# Patient Record
Sex: MALE | Race: WHITE
[De-identification: names, ages, dates, MRNs, and addresses within clinical notes are randomized per-mention and may not be internally consistent; named-entity substitution may affect disease eponyms.]

---

## 2019-08-23 ENCOUNTER — HOSPITAL ENCOUNTER (INPATIENT)
Dept: HOSPITAL 74 - YASAS | Age: 54
LOS: 5 days | Discharge: HOME | End: 2019-08-28
Attending: SURGERY | Admitting: SURGERY
Payer: COMMERCIAL

## 2019-08-23 VITALS — BODY MASS INDEX: 29.9 KG/M2

## 2019-08-23 DIAGNOSIS — F31.9: ICD-10-CM

## 2019-08-23 DIAGNOSIS — F10.230: Primary | ICD-10-CM

## 2019-08-23 DIAGNOSIS — F17.210: ICD-10-CM

## 2019-08-23 DIAGNOSIS — F32.9: ICD-10-CM

## 2019-08-23 DIAGNOSIS — F10.24: ICD-10-CM

## 2019-08-23 LAB
ALBUMIN SERPL-MCNC: 3.4 G/DL (ref 3.4–5)
ALP SERPL-CCNC: 120 U/L (ref 45–117)
ALT SERPL-CCNC: 19 U/L (ref 13–61)
ANION GAP SERPL CALC-SCNC: 8 MMOL/L (ref 8–16)
AST SERPL-CCNC: 12 U/L (ref 15–37)
BILIRUB SERPL-MCNC: 0.4 MG/DL (ref 0.2–1)
BUN SERPL-MCNC: 9.4 MG/DL (ref 7–18)
CALCIUM SERPL-MCNC: 9.2 MG/DL (ref 8.5–10.1)
CHLORIDE SERPL-SCNC: 106 MMOL/L (ref 98–107)
CO2 SERPL-SCNC: 27 MMOL/L (ref 21–32)
CREAT SERPL-MCNC: 0.9 MG/DL (ref 0.55–1.3)
DEPRECATED RDW RBC AUTO: 16.7 % (ref 11.9–15.9)
GLUCOSE SERPL-MCNC: 99 MG/DL (ref 74–106)
HCT VFR BLD CALC: 40.1 % (ref 35.4–49)
HGB BLD-MCNC: 13.6 GM/DL (ref 11.7–16.9)
MCH RBC QN AUTO: 31.5 PG (ref 25.7–33.7)
MCHC RBC AUTO-ENTMCNC: 33.9 G/DL (ref 32–35.9)
MCV RBC: 93 FL (ref 80–96)
PLATELET # BLD AUTO: 229 K/MM3 (ref 134–434)
PMV BLD: 8.3 FL (ref 7.5–11.1)
POTASSIUM SERPLBLD-SCNC: 4.2 MMOL/L (ref 3.5–5.1)
PROT SERPL-MCNC: 7.6 G/DL (ref 6.4–8.2)
RBC # BLD AUTO: 4.32 M/MM3 (ref 4–5.6)
SODIUM SERPL-SCNC: 141 MMOL/L (ref 136–145)
WBC # BLD AUTO: 9.3 K/MM3 (ref 4–10)

## 2019-08-23 PROCEDURE — HZ2ZZZZ DETOXIFICATION SERVICES FOR SUBSTANCE ABUSE TREATMENT: ICD-10-PCS | Performed by: ALLERGY & IMMUNOLOGY

## 2019-08-23 RX ADMIN — BUSPIRONE HYDROCHLORIDE SCH MG: 10 TABLET ORAL at 22:12

## 2019-08-23 RX ADMIN — Medication SCH MG: at 22:12

## 2019-08-23 RX ADMIN — BUSPIRONE HYDROCHLORIDE SCH: 10 TABLET ORAL at 14:34

## 2019-08-23 NOTE — EKG
Test Reason : 

Blood Pressure : ***/*** mmHG

Vent. Rate : 089 BPM     Atrial Rate : 089 BPM

   P-R Int : 136 ms          QRS Dur : 096 ms

    QT Int : 354 ms       P-R-T Axes : 000 068 027 degrees

   QTc Int : 430 ms

 

NORMAL SINUS RHYTHM

NORMAL ECG

NO PREVIOUS ECGS AVAILABLE

Confirmed by GERALD ANDERS MD ([id number]) on 8/23/2019 11:46:38 AM

 

Referred By:             Confirmed By:GERALD ANDERS MD

## 2019-08-23 NOTE — HP
CIWA Score


Nausea/Vomitin-No Nausea/No Vomiting


Muscle Tremors: 1-None Visible, but Felt


Anxiety: 4-Mod. Anxious/Guarded


Agitation: 3


Paroxysmal Sweats: 2


Orientation: 0-Oriented


Tacttile Disturbances: 0-None


Auditory Disturbances: 0-None


Visual Disturbances: 0-None


Headache: 2-Mild


CIWA-Ar Total Score: 12





- Admission Criteria


OASAS Guidelines: Admission for Medically Managed Detox: 


Requires at least one of the followin. CIWA greater than 12


2. Seizures within the past 24 hours


3. Delirium tremens within the past 24 hours


4. Hallucinations within the past 24 hours


5. Acute intervention needed for co  occurring medical disorder


6. Acute intervention needed for co  occurring psychiatric disorder


7. Severe withdrawal that cannot be handled at a lower level of care (continued


    vomiting, continued diarrhea, abnormal vital signs) requiring intravenous


    medication and/or fluids


8. Pregnancy








Admission ROS [facility]





- hospitals


Chief Complaint: 





ETOH withdrawal symptoms


Allergies/Adverse Reactions: 


 Allergies











Allergy/AdvReac Type Severity Reaction Status Date / Time


 


Fish Containing Products Allergy Intermediate Rash Verified 14 16:55


 


No Known Drug Allergies Allergy   Verified 14 15:03











History of Present Illness: 





Patient presents with ETOH withdrawal symptoms. Patient is known to [facility] due to 

previous admissions with last admission in . Patient states he was admitted 

to detox at [facility] 3-4 weeks ago but relapsed day after discharge. Patient 

states he started drinking at age 13, drinks all day and night 12-16 oz of beer 

and 4 locos daily, +binge drinking, eye openers and black outs. Denies hx of 

seizures, use of cocaine, marijuana and heroin. Patient's last drink was early 

this morning. PMH includes tobacco use, anxiety and depression. Patient denies 

SI/HI. Compliant with psych meds which he bought with him. 





Fluoxetine 20mg daily


Buspar 15mg bid








Urine tox + bzo


TIA 0.0


Exam Limitations: No Limitations





- Ebola screening


Have you traveled outside of the country in the last 21 days: No (N)


Have you had contact with anyone from an Ebola affected area: No


Have you been sick,other than usual withdrawal symptoms: No


Do you have a fever: No





- Review of Systems


Constitutional: Night Sweats, Changes in sleep


EENT: reports: No Symptoms Reported


Respiratory: reports: Cough (dry cough)


Cardiac: reports: No Symptoms Reported


GI: reports: Poor Fluid Intake


: reports: Frequency (due to ETOH intake)


Musculoskeletal: reports: No Symptoms Reported


Integumentary: reports: Flushing, Sweating


Neuro: reports: Headache, Tremors


Endocrine: reports: Flushing


Hematology: reports: No Symptoms Reported


Psychiatric: reports: Orientated x3, Anxious





Patient History





- Patient Medical History


Hx Anemia: No


Hx Asthma: No


Hx Chronic Obstructive Pulmonary Disease (COPD): No


Hx Cancer: No


Hx Cardiac Disorders: No


Hx Congestive Heart Failure: No


Hx Hypertension: No


Hx Hypercholesterolemia: No


Hx Pacemaker: No


HX Cerebrovascular Accident: No


Hx Seizures: No


Hx Dementia: No


Hx Diabetes: No


Hx Gastrointestinal Disorders: No


Hx Liver Disease: No


Hx Genitourinary Disorders: No


Hx Sexually Transmitted Disorders: No


Hx Renal Disease (ESRD): No


Hx Thyroid Disease: No


Hx Human Immunodeficiency Virus (HIV): No (negative 6 months ago.)


Hx Hepatitis C: No


Hx Depression: Yes


Hx Suicide Attempt: No


Hx Bipolar Disorder: No


Hx Schizophrenia: No


Other Medical History: anxiety





- Patient Surgical History


Past Surgical History: Yes


Hx Neurologic Surgery: No


Hx Cataract Extraction: No


Hx Cardiac Surgery: No


Hx Lung Surgery: No


Hx Breast Surgery: No


Hx Breast Biopsy: No


Hx Abdominal Surgery: No


Hx Appendectomy: No


Hx Cholecystectomy: No


Hx Genitourinary Surgery: No


Hx Orthopedic Surgery: No


Other Surgical History: right lense placement


Anesthesia Reaction: No





- PPD History


Previous Implant?: Yes


Documented Results: Negative w/proof


Date: 14


Results: 0 mm


PPD to be Administered?: No





- Smoking Cessation


Smoking history: Current every day smoker


Have you smoked in the past 12 months: Yes


Aproximately how many cigarettes per day: 25


Cigars Per Day: 0


Hx Chewing Tobacco Use: No


Initiated information on smoking cessation: Yes


'Breaking Loose' booklet given: 19





- Substance & Tx. History


Hx Alcohol Use: Yes


Hx Substance Use: No


Substance Use Type: Alcohol





- Substances abused


  ** Alcohol


Substance route: Oral


Frequency: Daily


Amount used: 12 (16 OUNCES ) BEER AND, 4 CANS OF LOGOS


Age of first use: 14


Date of last use: 19





Family Disease History





- Family Disease History


Family Disease History: Other: Father ( [place])





Admission Physical Exam [facility]





- Vital Signs


Vital Signs: 


 Vital Signs - 24 hr











  19





  09:52


 


Temperature 97.3 F L


 


Pulse Rate 97 H


 


Respiratory 18





Rate 


 


Blood Pressure 167/92














- Physical


General Appearance: Yes: Nourished, Appropriately Dressed, Tremorous, Sweating, 

Anxious


HEENTM: Yes: EOMI, Hearing grossly Normal, Normal ENT Inspection, Normocephalic

, Normal Voice, CHRISTA, Pharynx Normal


Respiratory: Yes: Chest Non-Tender, Lungs Clear, Normal Breath Sounds, No 

Respiratory Distress, No Accessory Muscle Use


Neck: Yes: No masses,lesions,Nodules, Supple, Trachea in good position


Breast: Yes: Breast Exam Deferred


Cardiology: Yes: Regular Rhythm, Regular Rate, S1, S2, Edema (trace pedal edema)


Abdominal: Yes: Normal Bowel Sounds, Non Tender, Soft


Genitourinary: Yes: Frequency


Back: Yes: Normal Inspection


Musculoskeletal: Yes: full range of Motion, Gait Steady


Extremities: Yes: Normal Inspection, Normal Range of Motion, Non-Tender, Tremors


Neurological: Yes: CNs II-XII NML intact, Fully Oriented, Alert, Motor Strength 

5/5, Normal Response, Other (anxious)


Integumentary: Yes: Warm, Erythema, Moist


Lymphatic: Yes: Within Normal Limits





- Diagnostic


(1) Alcohol dependence with uncomplicated withdrawal


Current Visit: Yes   Status: Acute   





(2) Anxiety


Current Visit: Yes   Status: Chronic   





(3) Nicotine dependence


Current Visit: No   Status: Chronic   





Cleared for Admission [facility]





- Detox or Rehab


[facility] Level of Care: Medically Managed


Detox Regimen/Protocol: Librium





Breathalyzer





- Breathalyzer


Breathalyzer: 0





Urine Drug Screen





- Results


Drug screen NEGATIVE: No


Urine drug screen results: BZO-Benzodiazepines





Inpatient Rehab Admission





- Rehab Decision to Admit


Inpatient rehab admission?: No

## 2019-08-24 RX ADMIN — FLUOXETINE HYDROCHLORIDE SCH MG: 20 CAPSULE ORAL at 10:38

## 2019-08-24 RX ADMIN — BUSPIRONE HYDROCHLORIDE SCH MG: 10 TABLET ORAL at 22:00

## 2019-08-24 RX ADMIN — Medication SCH TAB: at 10:38

## 2019-08-24 RX ADMIN — Medication PRN MG: at 22:01

## 2019-08-24 RX ADMIN — BUSPIRONE HYDROCHLORIDE SCH MG: 10 TABLET ORAL at 10:40

## 2019-08-24 RX ADMIN — Medication SCH MG: at 21:58

## 2019-08-24 RX ADMIN — NICOTINE SCH MG: 21 PATCH TRANSDERMAL at 10:37

## 2019-08-24 NOTE — PN
S CIWA





- CIWA Score


Nausea/Vomitin-No Nausea/No Vomiting


Muscle Tremors: 2


Anxiety: 4-Mod. Anxious/Guarded


Agitation: 2


Paroxysmal Sweats: 3 (And  Alternating Hot / Cold Sesnsations.)


Orientation: 0-Oriented


Tacttile Disturbances: 0-None


Auditory Disturbances: 0-None


Visual Disturbances: 2-Mild Sensitivity


Headache: 0-None Present


CIWA-Ar Total Score: 13





[facility] Progress Note (SOAP)


Subjective: 





Anxious, Fatigue, Sweating, Tremors, Alternating Hot / Cold Sensations.


Objective: 


PATIENT A & O X 3. IN NO ACUTE DISTRESS.





19 15:33


 Vital Signs











Temperature  98.8 F   19 13:05


 


Pulse Rate  89   19 13:05


 


Respiratory Rate  18   19 13:05


 


Blood Pressure  115/65   19 13:05


 


O2 Sat by Pulse Oximetry (%)      








 Laboratory Tests











  19





  10:50 10:50 10:50


 


WBC  9.3  


 


RBC  4.32  


 


Hgb  13.6  


 


Hct  40.1  


 


MCV  93.0  


 


MCH  31.5  


 


MCHC  33.9  


 


RDW  16.7 H  


 


Plt Count  229  D  


 


MPV  8.3  


 


Sodium   141 


 


Potassium   4.2 


 


Chloride   106 


 


Carbon Dioxide   27 


 


Anion Gap   8 


 


BUN   9.4 


 


Creatinine   0.9 


 


Est GFR (CKD-EPI)AfAm   110.27 


 


Est GFR (CKD-EPI)NonAf   95.14 


 


Random Glucose   99 


 


Calcium   9.2 


 


Total Bilirubin   0.4 


 


AST   12 L 


 


ALT   19 


 


Alkaline Phosphatase   120 H 


 


Total Protein   7.6 


 


Albumin   3.4 


 


RPR Titer    Nonreactive














LABS NOTED.


RESULTS OF DETOX ADMISSION QFT  /TB TEST PENDING.





19 15:34


Assessment: 





19 15:34


WITHDRAWAL SYMPTOMS.


Plan: 





CONTINUE DETOX.

## 2019-08-25 RX ADMIN — BUSPIRONE HYDROCHLORIDE SCH MG: 10 TABLET ORAL at 22:02

## 2019-08-25 RX ADMIN — Medication SCH TAB: at 10:29

## 2019-08-25 RX ADMIN — HYDROCORTISONE SCH APPLIC: 1 CREAM TOPICAL at 18:34

## 2019-08-25 RX ADMIN — NICOTINE SCH MG: 21 PATCH TRANSDERMAL at 10:29

## 2019-08-25 RX ADMIN — Medication PRN MG: at 22:02

## 2019-08-25 RX ADMIN — ALUMINUM HYDROXIDE, MAGNESIUM HYDROXIDE, AND SIMETHICONE PRN ML: 200; 200; 20 SUSPENSION ORAL at 10:31

## 2019-08-25 RX ADMIN — Medication SCH MG: at 22:02

## 2019-08-25 RX ADMIN — HYDROCORTISONE SCH APPLIC: 1 CREAM TOPICAL at 22:02

## 2019-08-25 RX ADMIN — FLUOXETINE HYDROCHLORIDE SCH MG: 20 CAPSULE ORAL at 10:29

## 2019-08-25 RX ADMIN — BUSPIRONE HYDROCHLORIDE SCH MG: 10 TABLET ORAL at 10:29

## 2019-08-25 NOTE — PN
[facility] CIWA





- CIWA Score


Nausea/Vomitin-No Nausea/No Vomiting


Muscle Tremors: 3


Anxiety: 3


Agitation: 2


Paroxysmal Sweats: 2


Orientation: 0-Oriented


Tacttile Disturbances: 1-Very Mild Itch/Numbness


Auditory Disturbances: 0-None


Visual Disturbances: 1-Very Mild Sensitivity


Headache: 0-None Present


CIWA-Ar Total Score: 12





[facility] Progress Note (SOAP)


Subjective: 





54 years old male admitted on 19 for acute alcohol withdrawal sx 

management


doing well with librium detox regimen


feeling better ambulating on hallway tolerate food and fluid well 


social with peer in day room


Objective: 





19 12:49


 Vital Signs











Temperature  96.1 F L  19 09:14


 


Pulse Rate  97 H  19 09:14


 


Respiratory Rate  18   19 09:14


 


Blood Pressure  116/87   19 09:14


 


O2 Sat by Pulse Oximetry (%)      








 Laboratory Last Values











WBC  9.3 K/mm3 (4.0-10.0)   19  10:50    


 


RBC  4.32 M/mm3 (4.00-5.60)   19  10:50    


 


Hgb  13.6 GM/dL (11.7-16.9)   19  10:50    


 


Hct  40.1 % (35.4-49)   19  10:50    


 


MCV  93.0 fl (80-96)   19  10:50    


 


MCH  31.5 pg (25.7-33.7)   19  10:50    


 


MCHC  33.9 g/dl (32.0-35.9)   19  10:50    


 


RDW  16.7 % (11.9-15.9)  H  19  10:50    


 


Plt Count  229 K/MM3 (134-434)  D 19  10:50    


 


MPV  8.3 fl (7.5-11.1)   19  10:50    


 


Sodium  141 mmol/L (136-145)   19  10:50    


 


Potassium  4.2 mmol/L (3.5-5.1)   19  10:50    


 


Chloride  106 mmol/L ()   19  10:50    


 


Carbon Dioxide  27 mmol/L (21-32)   19  10:50    


 


Anion Gap  8 MMOL/L (8-16)   19  10:50    


 


BUN  9.4 mg/dL (7-18)   19  10:50    


 


Creatinine  0.9 mg/dL (0.55-1.3)   19  10:50    


 


Est GFR (CKD-EPI)AfAm  110.27   19  10:50    


 


Est GFR (CKD-EPI)NonAf  95.14   19  10:50    


 


Random Glucose  99 mg/dL ()   19  10:50    


 


Calcium  9.2 mg/dL (8.5-10.1)   19  10:50    


 


Total Bilirubin  0.4 mg/dL (0.2-1)   19  10:50    


 


AST  12 U/L (15-37)  L  19  10:50    


 


ALT  19 U/L (13-61)   19  10:50    


 


Alkaline Phosphatase  120 U/L ()  H  19  10:50    


 


Total Protein  7.6 g/dl (6.4-8.2)   19  10:50    


 


Albumin  3.4 g/dl (3.4-5.0)   19  10:50    


 


RPR Titer  Nonreactive  (NONREACTIVE)   19  10:50    








lab noted


Assessment: 





19 12:50


alcohol withdrawal sx


Plan: 





continue lirium detox regimen

## 2019-08-26 RX ADMIN — BUSPIRONE HYDROCHLORIDE SCH MG: 10 TABLET ORAL at 22:14

## 2019-08-26 RX ADMIN — NICOTINE SCH MG: 21 PATCH TRANSDERMAL at 10:17

## 2019-08-26 RX ADMIN — Medication SCH TAB: at 10:16

## 2019-08-26 RX ADMIN — FLUOXETINE HYDROCHLORIDE SCH MG: 20 CAPSULE ORAL at 10:16

## 2019-08-26 RX ADMIN — HYDROCORTISONE SCH: 1 CREAM TOPICAL at 13:30

## 2019-08-26 RX ADMIN — BUSPIRONE HYDROCHLORIDE SCH MG: 10 TABLET ORAL at 10:16

## 2019-08-26 RX ADMIN — TRAZODONE HYDROCHLORIDE PRN MG: 50 TABLET ORAL at 22:13

## 2019-08-26 RX ADMIN — HYDROCORTISONE SCH: 1 CREAM TOPICAL at 22:14

## 2019-08-26 RX ADMIN — Medication SCH MG: at 22:13

## 2019-08-26 RX ADMIN — HYDROCORTISONE SCH APPLIC: 1 CREAM TOPICAL at 10:17

## 2019-08-26 RX ADMIN — CLOTRIMAZOLE SCH: 1 CREAM TOPICAL at 22:14

## 2019-08-26 RX ADMIN — HYDROCORTISONE SCH: 1 CREAM TOPICAL at 17:20

## 2019-08-26 NOTE — PN
[facility] CIWA





- CIWA Score


Nausea/Vomitin-Mild Nausea/No Vomiting


Muscle Tremors: 2


Anxiety: 1-Mildly Anxious


Agitation: 1-Slight > Activity


Paroxysmal Sweats: 1-Minimal Palms Moist


Orientation: 0-Oriented


Tacttile Disturbances: 0-None


Auditory Disturbances: 0-None


Visual Disturbances: 0-None


Headache: 1-Very Mild


CIWA-Ar Total Score: 7





BHS Progress Note (SOAP)


Subjective: 





pt complaints of back pain, says detox is going well.





O:


 Vital Signs - 24 hr











  19





  17:44 21:46 00:30


 


Temperature 96.9 F L 97.5 F L 


 


Pulse Rate 86 97 H 


 


Respiratory 18 16 18





Rate   


 


Blood Pressure 127/84 124/77 














  19





  03:30 06:19 06:30


 


Temperature  97.7 F 


 


Pulse Rate  76 


 


Respiratory 18 18 18





Rate   


 


Blood Pressure  129/75 














  19





  09:24 13:45


 


Temperature 98.1 F 97.2 F L


 


Pulse Rate 88 79


 


Respiratory 16 18





Rate  


 


Blood Pressure 140/95 124/83








 Laboratory Tests











  19





  10:50 10:50 10:50


 


WBC  9.3  


 


RBC  4.32  


 


Hgb  13.6  


 


Hct  40.1  


 


MCV  93.0  


 


MCH  31.5  


 


MCHC  33.9  


 


RDW  16.7 H  


 


Plt Count  229  D  


 


MPV  8.3  


 


Sodium   141 


 


Potassium   4.2 


 


Chloride   106 


 


Carbon Dioxide   27 


 


Anion Gap   8 


 


BUN   9.4 


 


Creatinine   0.9 


 


Est GFR (CKD-EPI)AfAm   110.27 


 


Est GFR (CKD-EPI)NonAf   95.14 


 


Random Glucose   99 


 


Calcium   9.2 


 


Total Bilirubin   0.4 


 


AST   12 L 


 


ALT   19 


 


Alkaline Phosphatase   120 H 


 


Total Protein   7.6 


 


Albumin   3.4 


 


RPR Titer    Nonreactive








a/p: continue alcohol detox protocol


prn meds for back pain
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
## 2019-08-27 LAB
APPEARANCE UR: CLEAR
BILIRUB UR STRIP.AUTO-MCNC: NEGATIVE MG/DL
COLOR UR: YELLOW
KETONES UR QL STRIP: NEGATIVE
LEUKOCYTE ESTERASE UR QL STRIP.AUTO: NEGATIVE
NITRITE UR QL STRIP: NEGATIVE
PH UR: 6 [PH] (ref 5–8)
PROT UR QL STRIP: NEGATIVE
PROT UR QL STRIP: NEGATIVE
SP GR UR: 1.02 (ref 1.01–1.03)
UROBILINOGEN UR STRIP-MCNC: 0.2 MG/DL (ref 0.2–1)

## 2019-08-27 RX ADMIN — HYDROCORTISONE SCH: 1 CREAM TOPICAL at 21:42

## 2019-08-27 RX ADMIN — HYDROCORTISONE SCH: 1 CREAM TOPICAL at 16:59

## 2019-08-27 RX ADMIN — HYDROCORTISONE SCH: 1 CREAM TOPICAL at 10:17

## 2019-08-27 RX ADMIN — BUSPIRONE HYDROCHLORIDE SCH MG: 10 TABLET ORAL at 22:01

## 2019-08-27 RX ADMIN — CLOTRIMAZOLE SCH: 1 CREAM TOPICAL at 10:18

## 2019-08-27 RX ADMIN — ALUMINUM HYDROXIDE, MAGNESIUM HYDROXIDE, AND SIMETHICONE PRN ML: 200; 200; 20 SUSPENSION ORAL at 17:00

## 2019-08-27 RX ADMIN — HYDROCORTISONE SCH: 1 CREAM TOPICAL at 13:54

## 2019-08-27 RX ADMIN — BUSPIRONE HYDROCHLORIDE SCH MG: 10 TABLET ORAL at 10:18

## 2019-08-27 RX ADMIN — TRAZODONE HYDROCHLORIDE PRN MG: 50 TABLET ORAL at 21:40

## 2019-08-27 RX ADMIN — NICOTINE SCH MG: 21 PATCH TRANSDERMAL at 10:17

## 2019-08-27 RX ADMIN — FLUOXETINE HYDROCHLORIDE SCH MG: 20 CAPSULE ORAL at 10:17

## 2019-08-27 RX ADMIN — Medication SCH MG: at 21:40

## 2019-08-27 RX ADMIN — CLOTRIMAZOLE SCH: 1 CREAM TOPICAL at 21:42

## 2019-08-27 RX ADMIN — Medication SCH TAB: at 10:17

## 2019-08-27 NOTE — PN
S CIWA





- CIWA Score


Nausea/Vomitin


Muscle Tremors: 2


Anxiety: 2


Agitation: 2


Paroxysmal Sweats: 1-Minimal Palms Moist


Orientation: 0-Oriented


Tacttile Disturbances: 0-None


Auditory Disturbances: 0-None


Visual Disturbances: 0-None


Headache: 2-Mild


CIWA-Ar Total Score: 11





[facility] Progress Note (SOAP)


Subjective: 





alert,irritable,anxious,interrupted sleep,


Objective: 





19 13:52


 Vital Signs











Temperature  97.7 F   19 13:12


 


Pulse Rate  83   19 13:12


 


Respiratory Rate  18   19 13:12


 


Blood Pressure  128/86   19 13:12


 


O2 Sat by Pulse Oximetry (%)      











Assessment: 





19 13:52


withdrawal symptom


Plan: 





continue detox librium regimen,discharge in am

## 2019-08-28 VITALS — SYSTOLIC BLOOD PRESSURE: 133 MMHG | HEART RATE: 77 BPM | DIASTOLIC BLOOD PRESSURE: 85 MMHG | TEMPERATURE: 97.6 F

## 2019-08-28 RX ADMIN — CLOTRIMAZOLE SCH: 1 CREAM TOPICAL at 09:29

## 2019-08-28 RX ADMIN — HYDROCORTISONE SCH: 1 CREAM TOPICAL at 09:29

## 2019-08-28 RX ADMIN — NICOTINE SCH: 21 PATCH TRANSDERMAL at 09:29

## 2019-08-28 RX ADMIN — FLUOXETINE HYDROCHLORIDE SCH: 20 CAPSULE ORAL at 09:30

## 2019-08-28 RX ADMIN — Medication SCH: at 09:30

## 2019-10-11 ENCOUNTER — HOSPITAL ENCOUNTER (INPATIENT)
Dept: HOSPITAL 74 - YASAS | Age: 54
LOS: 5 days | Discharge: OTHER INSTITUTION | End: 2019-10-16
Attending: ALLERGY & IMMUNOLOGY | Admitting: ALLERGY & IMMUNOLOGY
Payer: COMMERCIAL

## 2019-10-11 VITALS — BODY MASS INDEX: 30 KG/M2

## 2019-10-11 DIAGNOSIS — Z59.0: ICD-10-CM

## 2019-10-11 DIAGNOSIS — R03.0: ICD-10-CM

## 2019-10-11 DIAGNOSIS — Z91.013: ICD-10-CM

## 2019-10-11 DIAGNOSIS — F10.230: Primary | ICD-10-CM

## 2019-10-11 DIAGNOSIS — R00.0: ICD-10-CM

## 2019-10-11 DIAGNOSIS — F41.9: ICD-10-CM

## 2019-10-11 DIAGNOSIS — F17.210: ICD-10-CM

## 2019-10-11 PROCEDURE — HZ2ZZZZ DETOXIFICATION SERVICES FOR SUBSTANCE ABUSE TREATMENT: ICD-10-PCS | Performed by: ALLERGY & IMMUNOLOGY

## 2019-10-11 RX ADMIN — Medication SCH MG: at 22:14

## 2019-10-11 RX ADMIN — Medication PRN MG: at 22:14

## 2019-10-11 RX ADMIN — Medication SCH TAB: at 10:46

## 2019-10-11 SDOH — ECONOMIC STABILITY - HOUSING INSECURITY: HOMELESSNESS: Z59.0
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
## 2019-10-12 LAB
ALBUMIN SERPL-MCNC: 3 G/DL (ref 3.4–5)
ALP SERPL-CCNC: 95 U/L (ref 45–117)
ALT SERPL-CCNC: 23 U/L (ref 13–61)
ANION GAP SERPL CALC-SCNC: 4 MMOL/L (ref 8–16)
AST SERPL-CCNC: 19 U/L (ref 15–37)
BILIRUB SERPL-MCNC: 0.4 MG/DL (ref 0.2–1)
BUN SERPL-MCNC: 8.8 MG/DL (ref 7–18)
CALCIUM SERPL-MCNC: 8.7 MG/DL (ref 8.5–10.1)
CHLORIDE SERPL-SCNC: 110 MMOL/L (ref 98–107)
CO2 SERPL-SCNC: 28 MMOL/L (ref 21–32)
CREAT SERPL-MCNC: 0.9 MG/DL (ref 0.55–1.3)
DEPRECATED RDW RBC AUTO: 16.9 % (ref 11.9–15.9)
GLUCOSE SERPL-MCNC: 85 MG/DL (ref 74–106)
HCT VFR BLD CALC: 44.4 % (ref 35.4–49)
HGB BLD-MCNC: 14.8 GM/DL (ref 11.7–16.9)
MCH RBC QN AUTO: 31.8 PG (ref 25.7–33.7)
MCHC RBC AUTO-ENTMCNC: 33.3 G/DL (ref 32–35.9)
MCV RBC: 95.4 FL (ref 80–96)
PLATELET # BLD AUTO: 158 K/MM3 (ref 134–434)
PMV BLD: 8.2 FL (ref 7.5–11.1)
POTASSIUM SERPLBLD-SCNC: 3.6 MMOL/L (ref 3.5–5.1)
PROT SERPL-MCNC: 6.5 G/DL (ref 6.4–8.2)
RBC # BLD AUTO: 4.65 M/MM3 (ref 4–5.6)
SODIUM SERPL-SCNC: 142 MMOL/L (ref 136–145)
WBC # BLD AUTO: 5.6 K/MM3 (ref 4–10)

## 2019-10-12 RX ADMIN — BUSPIRONE HYDROCHLORIDE SCH MG: 10 TABLET ORAL at 22:22

## 2019-10-12 RX ADMIN — Medication SCH MG: at 22:21

## 2019-10-12 RX ADMIN — Medication SCH TAB: at 10:29

## 2019-10-12 NOTE — PN
S CIWA





- CIWA Score


Nausea/Vomitin-No Nausea/No Vomiting


Muscle Tremors: 2


Anxiety: 3


Agitation: 0-Normal Activity


Paroxysmal Sweats: 3


Orientation: 0-Oriented


Tacttile Disturbances: 0-None


Auditory Disturbances: 0-None


Visual Disturbances: 0-None


Headache: 2-Mild


CIWA-Ar Total Score: 10





BHS Progress Note (SOAP)


Subjective: 





c/o headache, sweats, shakes, and anxiety.


Objective: 





10/12/19 11:25


 Vital Signs











  10/12/19 10/12/19 10/12/19





  03:30 06:43 09:35


 


Temperature  97 F L 97.1 F L


 


Pulse Rate  70 82


 


Respiratory 18 18 18





Rate   


 


Blood Pressure  129/81 133/78








 Lab Results











WBC  5.6 K/mm3 (4.0-10.0)   10/12/19  08:00    


 


RBC  4.65 M/mm3 (4.00-5.60)   10/12/19  08:00    


 


Hgb  14.8 GM/dL (11.7-16.9)   10/12/19  08:00    


 


Hct  44.4 % (35.4-49)   10/12/19  08:00    


 


MCV  95.4 fl (80-96)   10/12/19  08:00    


 


MCHC  33.3 g/dl (32.0-35.9)   10/12/19  08:00    


 


RDW  16.9 % (11.9-15.9)  H  10/12/19  08:00    


 


Plt Count  158 K/MM3 (134-434)  D 10/12/19  08:00    


 


Sodium  142 mmol/L (136-145)   10/12/19  08:00    


 


Potassium  3.6 mmol/L (3.5-5.1)   10/12/19  08:00    


 


Chloride  110 mmol/L ()  H  10/12/19  08:00    


 


Carbon Dioxide  28 mmol/L (21-32)   10/12/19  08:00    


 


Anion Gap  4 MMOL/L (8-16)  L  10/12/19  08:00    


 


BUN  8.8 mg/dL (7-18)   10/12/19  08:00    


 


Creatinine  0.9 mg/dL (0.55-1.3)   10/12/19  08:00    


 


Random Glucose  85 mg/dL ()   10/12/19  08:00    


 


Calcium  8.7 mg/dL (8.5-10.1)   10/12/19  08:00    








Labs noted.


Assessment: 





10/12/19 11:25


AOX3, in no acute respiratory distress.


Full ROM, ambulating in the unit.


Withdrawal symptoms.


Plan: 





continue detox.

## 2019-10-12 NOTE — CONSULT
BHS Psychiatric Consult





- Data


Date of interview: 10/12/19


Admission source: [facility]


Identifying data: This is one of multiple admissions to [facility] for this 54 y/

o  male self-referred for detoxification (alcohol). Interviewed at [address] [place] [address]. Patient is , a father of one, homeless, unemployed and supported 

on " panhandling " and food stamps.


Substance Abuse History: Discussed in this interview. Details in current BHS 

report as follows : Smoking history: Current every day smoker.  Have you smoked 

in the past 12 months: Yes.  Aproximately how many cigarettes per day: 20.  

Cigars Per Day: 0.  Hx Chewing Tobacco Use: No.  Initiated information on 

smoking cessation: Yes.  'Breaking Loose' booklet given: 10/11/19.  - 

Substances abused.  ** Alcohol.  Substance route: Oral.  Frequency: Daily.  

Amount used: 1 pint of Rum, 5 (16 oz)beers.  Age of first use: 14.  Date of 

last use: 10/10/19


Medical History: Patient endorses good general health. Noted history surgery (

right lens placement).


Psychiatric History: Patient denies history of psychiatric hospitalizations. 

First contact with [facility] for detoxification occurred in 2014 (as per records

). Mr Peterson declares that he was released from longterm on July 19, 2019 after 

five years of incarceration ([place]). He indicates that, while in longterm, he 

received the diagnoses of MDD + Anxiety Disorder and got medicated with with 

prozac 20 mg/day + buspar 15 mg/bid. He sees a psychiatrist, Dr Radha Veronica 

at [facility] for medication management (rediagnosed patient 

with PTSD). Patient denies history of suicide attempts.


Physical/Sexual Abuse/Trauma History: Traumas : past history of divorce, 

estrangement from relatives, homelessness, unemployment, financial difficulties

, exposure to violence during years of incarceration, loneliness, alcohol use 

disorder, chronic depression and lack of a support network.


Additional Comment: Urine drug screen results: BZO-Benzodiazepines. Noted.





Mental Status Exam





- Mental Status Exam


Alert and Oriented to: Time, Place, Person


Cognitive Function: Good


Patient Appearance: Well Groomed


Mood: Nervous, Withdrawn, Anxious


Affect: Mood Congruent, Constricted


Patient Behavior: Fatigued, Cooperative


Speech Pattern: Clear, Appropriate


Voice Loudness: Normal


Thought Process: Goal Oriented


Thought Disorder: Not Present


Hallucinations: Denies


Suicidal Ideation: Denies


Homicidal Ideation: Denies


Insight/Judgement: Poor


Sleep: Well


Appetite: Good


Muscle strength/Tone: Normal


Gait/Station: Normal





Psychiatric Findings





- Problem List (Axis 1, 2,3)


(1) Alcohol dependence with uncomplicated withdrawal


Current Visit: Yes   Status: Acute   





(2) Nicotine dependence


Current Visit: Yes   Status: Chronic   





(3) Anxiety disorder


Current Visit: Yes   Status: Chronic   





(4) History of depression


Current Visit: Yes   Status: Chronic   Comment: As per records. On medications.

    





- Initial Treatment Plan


Initial Treatment Plan: Psychoeducation (including information about alcohol-

MAT services addressing relapse prevention). Sleep hygiene. Detoxification in 

progress. AA meetings. Resumed : prozac 20 mg po daily + buspar 15 mg po bid. 

Side effects/benefits of both drugs are discussed with patient. Mr Peterson is 

agreeable with this plan of care. Gave verbal consent to MD. Madrigal.

## 2019-10-13 RX ADMIN — BUSPIRONE HYDROCHLORIDE SCH MG: 10 TABLET ORAL at 10:30

## 2019-10-13 RX ADMIN — Medication PRN MG: at 22:02

## 2019-10-13 RX ADMIN — Medication SCH MG: at 22:01

## 2019-10-13 RX ADMIN — BUSPIRONE HYDROCHLORIDE SCH MG: 10 TABLET ORAL at 22:02

## 2019-10-13 RX ADMIN — Medication SCH TAB: at 10:31

## 2019-10-13 RX ADMIN — FLUOXETINE HYDROCHLORIDE SCH MG: 20 CAPSULE ORAL at 10:31

## 2019-10-13 NOTE — PN
S CIWA





- CIWA Score


Nausea/Vomitin-No Nausea/No Vomiting


Muscle Tremors: None


Anxiety: 2


Agitation: 0-Normal Activity


Paroxysmal Sweats: 3


Orientation: 0-Oriented


Tacttile Disturbances: 0-None


Auditory Disturbances: 0-None


Visual Disturbances: 0-None


Headache: 1-Very Mild


CIWA-Ar Total Score: 6





BHS Progress Note (SOAP)


Subjective: 





c/o anxiety, sweats, and mild headache.


Objective: 





10/13/19 10:34


 Vital Signs











  10/13/19 10/13/19 10/13/19





  03:30 06:23 09:22


 


Temperature  98.2 F 97.2 F L


 


Pulse Rate  71 95 H


 


Respiratory 18 18 18





Rate   


 


Blood Pressure  128/81 148/105 H








 Lab Results











WBC  5.6 K/mm3 (4.0-10.0)   10/12/19  08:00    


 


RBC  4.65 M/mm3 (4.00-5.60)   10/12/19  08:00    


 


Hgb  14.8 GM/dL (11.7-16.9)   10/12/19  08:00    


 


Hct  44.4 % (35.4-49)   10/12/19  08:00    


 


MCV  95.4 fl (80-96)   10/12/19  08:00    


 


MCHC  33.3 g/dl (32.0-35.9)   10/12/19  08:00    


 


RDW  16.9 % (11.9-15.9)  H  10/12/19  08:00    


 


Plt Count  158 K/MM3 (134-434)  D 10/12/19  08:00    


 


Sodium  142 mmol/L (136-145)   10/12/19  08:00    


 


Potassium  3.6 mmol/L (3.5-5.1)   10/12/19  08:00    


 


Chloride  110 mmol/L ()  H  10/12/19  08:00    


 


Carbon Dioxide  28 mmol/L (21-32)   10/12/19  08:00    


 


Anion Gap  4 MMOL/L (8-16)  L  10/12/19  08:00    


 


BUN  8.8 mg/dL (7-18)   10/12/19  08:00    


 


Creatinine  0.9 mg/dL (0.55-1.3)   10/12/19  08:00    


 


Random Glucose  85 mg/dL ()   10/12/19  08:00    


 


Calcium  8.7 mg/dL (8.5-10.1)   10/12/19  08:00    








Labs noted.


Assessment: 





10/13/19 10:35


AOX3, in no respiratory distress.


Full ROM, ambulating in the unit.


Withdrawal symptoms.


Plan: 





continue detox.

## 2019-10-14 RX ADMIN — FLUOXETINE HYDROCHLORIDE SCH MG: 20 CAPSULE ORAL at 10:26

## 2019-10-14 RX ADMIN — Medication SCH TAB: at 10:26

## 2019-10-14 RX ADMIN — CLOTRIMAZOLE SCH APPLIC: 1 CREAM TOPICAL at 21:20

## 2019-10-14 RX ADMIN — Medication SCH APPLIC: at 21:21

## 2019-10-14 RX ADMIN — Medication SCH MG: at 21:21

## 2019-10-14 NOTE — PN
S CIWA





- CIWA Score


Nausea/Vomitin-No Nausea/No Vomiting


Muscle Tremors: None


Anxiety: 3


Agitation: 3


Paroxysmal Sweats: 3


Orientation: 0-Oriented


Tacttile Disturbances: 0-None


Auditory Disturbances: 0-None


Visual Disturbances: 0-None


Headache: 1-Very Mild


CIWA-Ar Total Score: 10





BHS Progress Note (SOAP)


Subjective: 





Anxious, Restless, Sweating.


Objective: 


PATIENT A & O X 3, OBSERVED AMBULATING ON DETOX UNIT UNASSISTED. IN NO ACUTE 

DISTRESS.





10/14/19 16:19


 Vital Signs











Temperature  98.9 F   10/14/19 13:10


 


Pulse Rate  81   10/14/19 13:10


 


Respiratory Rate  18   10/14/19 13:10


 


Blood Pressure  126/82   10/14/19 13:10


 


O2 Sat by Pulse Oximetry (%)      








 Laboratory Tests











  10/12/19 10/12/19 10/12/19





  08:00 08:00 08:00


 


WBC  5.6  


 


RBC  4.65  


 


Hgb  14.8  


 


Hct  44.4  


 


MCV  95.4  


 


MCH  31.8  


 


MCHC  33.3  


 


RDW  16.9 H  


 


Plt Count  158  D  


 


MPV  8.2  


 


Sodium   142 


 


Potassium   3.6 


 


Chloride   110 H 


 


Carbon Dioxide   28 


 


Anion Gap   4 L 


 


BUN   8.8 


 


Creatinine   0.9 


 


Est GFR (CKD-EPI)AfAm   110.27 


 


Est GFR (CKD-EPI)NonAf   95.14 


 


Random Glucose   85 


 


Calcium   8.7 


 


Total Bilirubin   0.4 


 


AST   19 


 


ALT   23 


 


Alkaline Phosphatase   95 


 


Total Protein   6.5 


 


Albumin   3.0 L 


 


RPR Titer    Nonreactive








LABS NOTED.


Assessment: 





10/14/19 16:19


WITHDRAWAL SYMPTOMS.


Plan: 





CONTINUE DETOX.

## 2019-10-15 RX ADMIN — CLOTRIMAZOLE SCH APPLIC: 1 CREAM TOPICAL at 22:02

## 2019-10-15 RX ADMIN — Medication SCH MG: at 22:02

## 2019-10-15 RX ADMIN — CLOTRIMAZOLE SCH APPLIC: 1 CREAM TOPICAL at 10:38

## 2019-10-15 RX ADMIN — Medication PRN MG: at 22:03

## 2019-10-15 RX ADMIN — Medication SCH APPLIC: at 10:40

## 2019-10-15 RX ADMIN — FLUOXETINE HYDROCHLORIDE SCH MG: 20 CAPSULE ORAL at 10:38

## 2019-10-15 RX ADMIN — Medication SCH: at 22:04

## 2019-10-15 RX ADMIN — Medication SCH TAB: at 10:38

## 2019-10-15 NOTE — PN
S CIWA





- CIWA Score


Nausea/Vomitin-No Nausea/No Vomiting


Muscle Tremors: None


Anxiety: 3


Agitation: 3


Paroxysmal Sweats: No Perspiration


Orientation: 0-Oriented


Tacttile Disturbances: 2-Mild Itch/Numbness/Burn


Auditory Disturbances: 0-None


Visual Disturbances: 0-None


Headache: 0-None Present


CIWA-Ar Total Score: 8





BHS Progress Note (SOAP)


Subjective: 





Anxious, Restless, Sweating.


Objective: 


PATIENT A & O X 3, OBSERVED AMBULATING ON DETOX UNIT UNASSISTED. IN NO ACUTE 

DISTRESS.





10/15/19 15:14


 Vital Signs











Temperature  96.5 F L  10/15/19 13:39


 


Pulse Rate  87   10/15/19 13:39


 


Respiratory Rate  18   10/15/19 13:39


 


Blood Pressure  135/92   10/15/19 13:39


 


O2 Sat by Pulse Oximetry (%)      








 Laboratory Tests











  10/12/19 10/12/19 10/12/19





  08:00 08:00 08:00


 


WBC  5.6  


 


RBC  4.65  


 


Hgb  14.8  


 


Hct  44.4  


 


MCV  95.4  


 


MCH  31.8  


 


MCHC  33.3  


 


RDW  16.9 H  


 


Plt Count  158  D  


 


MPV  8.2  


 


Sodium   142 


 


Potassium   3.6 


 


Chloride   110 H 


 


Carbon Dioxide   28 


 


Anion Gap   4 L 


 


BUN   8.8 


 


Creatinine   0.9 


 


Est GFR (CKD-EPI)AfAm   110.27 


 


Est GFR (CKD-EPI)NonAf   95.14 


 


Random Glucose   85 


 


Calcium   8.7 


 


Total Bilirubin   0.4 


 


AST   19 


 


ALT   23 


 


Alkaline Phosphatase   95 


 


Total Protein   6.5 


 


Albumin   3.0 L 


 


RPR Titer    Nonreactive








LABS NOTED.


Assessment: 





10/15/19 15:14


WITHDRAWAL SYMPTOMS.


Plan: 





CONTINUE DETOX.





PATIENT SCHEDULED FOR D/C FROM DETOX UNIT TOMORROW.

## 2019-10-16 ENCOUNTER — HOSPITAL ENCOUNTER (INPATIENT)
Dept: HOSPITAL 74 - YASAS | Age: 54
LOS: 1 days | Discharge: HOME | DRG: 772 | End: 2019-10-17
Attending: NEUROMUSCULOSKELETAL MEDICINE & OMM | Admitting: NEUROMUSCULOSKELETAL MEDICINE & OMM
Payer: COMMERCIAL

## 2019-10-16 VITALS — TEMPERATURE: 97 F | SYSTOLIC BLOOD PRESSURE: 129 MMHG | HEART RATE: 79 BPM | DIASTOLIC BLOOD PRESSURE: 80 MMHG

## 2019-10-16 DIAGNOSIS — Z59.0: ICD-10-CM

## 2019-10-16 DIAGNOSIS — F10.20: Primary | ICD-10-CM

## 2019-10-16 DIAGNOSIS — F17.210: ICD-10-CM

## 2019-10-16 DIAGNOSIS — Z91.013: ICD-10-CM

## 2019-10-16 DIAGNOSIS — F32.9: ICD-10-CM

## 2019-10-16 PROCEDURE — HZ42ZZZ GROUP COUNSELING FOR SUBSTANCE ABUSE TREATMENT, COGNITIVE-BEHAVIORAL: ICD-10-PCS | Performed by: ALLERGY & IMMUNOLOGY

## 2019-10-16 RX ADMIN — Medication SCH: at 09:30

## 2019-10-16 RX ADMIN — Medication SCH TAB: at 09:29

## 2019-10-16 RX ADMIN — FLUOXETINE HYDROCHLORIDE SCH MG: 20 CAPSULE ORAL at 09:29

## 2019-10-16 RX ADMIN — TOLNAFTATE SCH: 1 SOLUTION TOPICAL at 17:48

## 2019-10-16 RX ADMIN — CLOTRIMAZOLE SCH: 1 CREAM TOPICAL at 09:30

## 2019-10-16 SDOH — ECONOMIC STABILITY - HOUSING INSECURITY: HOMELESSNESS: Z59.0

## 2019-10-16 NOTE — DS
BHS Detox Discharge Summary


Admission Date: 


10/11/19





Discharge Date: 10/16/19





- History


Present History: Alcohol Dependence


Additional Comments: 





PATIENT GOING TO [facility] ([facility]) FOR AFTERCARE. PATIENT 

WAS DISCHARGED FROM DETOX UNIT TO BE TAKEN OVER TO REHAB UNIT IN STABLE MEDICAL 

CONDITION.


Pertinent Past History: 





Nicotine Dependence, History Of Depression, History of Anxiety Disorder, 

Elevated Blood Pressure Reading (without diagnosis of Hypertension).





- Physical Exam Results


Vital Signs: 


 Vital Signs











Temperature  97.0 F L  10/16/19 09:19


 


Pulse Rate  79   10/16/19 09:19


 


Respiratory Rate  18   10/16/19 09:19


 


Blood Pressure  129/80   10/16/19 09:19


 


O2 Sat by Pulse Oximetry (%)      











Pertinent Admission Physical Exam Findings: 





WITHDRAWAL SYMPTOMS.





 Laboratory Tests











  10/12/19 10/12/19 10/12/19





  08:00 08:00 08:00


 


WBC  5.6  


 


RBC  4.65  


 


Hgb  14.8  


 


Hct  44.4  


 


MCV  95.4  


 


MCH  31.8  


 


MCHC  33.3  


 


RDW  16.9 H  


 


Plt Count  158  D  


 


MPV  8.2  


 


Sodium   142 


 


Potassium   3.6 


 


Chloride   110 H 


 


Carbon Dioxide   28 


 


Anion Gap   4 L 


 


BUN   8.8 


 


Creatinine   0.9 


 


Est GFR (CKD-EPI)AfAm   110.27 


 


Est GFR (CKD-EPI)NonAf   95.14 


 


Random Glucose   85 


 


Calcium   8.7 


 


Total Bilirubin   0.4 


 


AST   19 


 


ALT   23 


 


Alkaline Phosphatase   95 


 


Total Protein   6.5 


 


Albumin   3.0 L 


 


RPR Titer    Nonreactive








LABS NOTED.





- Treatment


Hospital Course: Detox Protocol Followed, Detoxed Safely, Responded well, 

Discharged Condition Good, Rehab Referral Accepted


Patient has Accepted a Rehab Referral to: [facility] ([place], [place]).





- Medication


Discharge Medications: 


Ambulatory Orders





Buspirone HCl [Buspar -] 15 mg PO BID #60 tablet 10/15/19 


Fluoxetine HCl [Prozac] 20 mg PO DAILY 10/16/19 


traZODone HCL [Trazodone HCl] 50 mg PO HS PRN 10/16/19 











- Diagnosis


(1) Alcohol dependence with uncomplicated withdrawal


Status: Acute   





(2) Anxiety disorder


Status: Chronic   


Qualifiers: 


   Anxiety disorder type: unspecified anxiety disorder   Qualified Code(s): 

F41.9 - Anxiety disorder, unspecified   





(3) History of depression


Status: Chronic   





(4) Nicotine dependence


Status: Chronic   





(5) Elevated blood pressure reading with diagnosis of hypertension


Status: Acute   





- AMA


Did Patient Leave Against Medical Advice: No





BHS CIWA





- CIWA Score


Nausea/Vomitin-No Nausea/No Vomiting


Muscle Tremors: None


Anxiety: 2


Agitation: 1-Slight > Activity


Paroxysmal Sweats: No Perspiration


Orientation: 0-Oriented


Tacttile Disturbances: 0-None


Auditory Disturbances: 0-None


Visual Disturbances: 0-None


Headache: 0-None Present


CIWA-Ar Total Score: 3

## 2019-10-16 NOTE — HP
BHS MD Rehab Assess/Revision





- Admission History


Admitted to Rehab from: [facility]


Date of Admission to Rehab: 10/16/2019





- Vital signs


Vital Signs: 


 Vital Signs











 Period  Temp  Pulse  Resp  BP Sys/Galvez  Pulse Ox


 


 Last 24 Hr  98.2 F  91  18  129/82  














- Findings


Detox History & Physical reviewed: Yes


Concur with findings: Yes


Comments/Additional Findings: PATIENT'S MEDICAL / MEDICATION HISTORY REVIEWED 

PRIOR TO DISCHARGE FROM DETOX UNIT. PATIENT WAS DISCHARGED FROM DETOX UNIT TO 

BE TAKEN OVER TO REHAB UNIT IN STABLE MEDICAL CONDITION.





Inpatient Rehab Admission





- Rehab Decision to Admit


Inpatient rehab admission?: Yes





- Initial Determination


Are CD services needed?: Yes


Free of communicable disease: Yes


Not in need of hospitalization: Yes





- Rehab Admission Criteria


Previous failed treatment: Yes


Poor recovery environment: No


Comorbidities: Yes


Lacks judgement: No


Patient is meeting Inpatient Rehab admission criteria:: Yes

## 2019-10-17 VITALS — TEMPERATURE: 97.4 F | HEART RATE: 79 BPM | SYSTOLIC BLOOD PRESSURE: 140 MMHG | DIASTOLIC BLOOD PRESSURE: 81 MMHG

## 2019-10-17 RX ADMIN — TOLNAFTATE SCH: 1 SOLUTION TOPICAL at 10:20

## 2019-10-17 NOTE — PN
BHS Progress Note


Note: 





Psychiatric nurse practitioner note:


Patient leaving rehab today as he plans to find housing. A 30 day prescription 

of Prozac 20mg + Buspar 15mg BID was electronically sent to [facility] at [address] [place] [address].

## 2019-10-17 NOTE — DS
[facility] Rehab Discharge Summary





- [facility] Rehab Discharge Summary


Admission Date: 10/16/19


Discharge Date: 10/17/19





- History


Present History: Alcohol dependence


Additional Comments: 





Pt is a 55 y/o male who completed detox yesterday on [facility] and admitted to 

re[facility] same day. Pt comes to staff this morning and requests early 

discharge stating " I have things to do out there. winter's coming a little 

faster and I have to take care of things instead of sitting in here. You guys 

can't help me here with housing, that's what I need now. I have been to too 

many 28 day rehabs and if i don't want to drink, I will not drink". Pt was 

spoken to with Director, Binta Caceres and seen by his counselor Whit Whalen for aftercare referral. Pt has been referred to [facility] and 

[facility] for CD aftercare and housing needs, respectively.


Pertinent Past History: 





Hx Depression








- Discharge Physical Exam


Vital Signs: 


 Vital Signs











Temperature  97.4 F L  10/17/19 06:51


 


Pulse Rate  79   10/17/19 06:51


 


Respiratory Rate  18   10/17/19 06:51


 


Blood Pressure  140/81   10/17/19 06:51


 


O2 Sat by Pulse Oximetry (%)      








Alert o x 3. Denies s/h/i


nad


oob ambulating with steady gait.


cardiac;s1 s2,rrr


lungs;cta,loy.


abdomen:protruded,+bs,nt,nd


extremities/skin:no edema,full ROM,skin intact.





Pertinent Admission Physical Exam Findings: 





Unremarkable/Status medically stable and unchanged from admission.





- Treatment


Discharge Condition: Discharge condition good


Hospital Course: 





Rehab stay was safe.








- Medication


Discharge Medications: 


Ambulatory Orders





Buspirone HCl [Buspar -] 15 mg PO BID #60 tablet 10/17/19 


Fluoxetine HCl [Prozac -] 20 mg PO DAILY #30 capsule 10/17/19 











- Medication-Assisted Treatment (MAT)


Medication-Assisted Treatment (MAT): No





- Discharge Instructions


Diet, activity, other medical instructions: 





Diet:Regular





Activity: oob ad aparna


Other medical instructions:Follow up with primary care doctor, Dr. Radha Veronica for medical/psych management within 1-2 weeks after discharge and as 

needed.


Follow up wit CD aftercare at [facility] and  [facility] for 

Housing needs.








- Diagnosis


(1) Alcohol dependence


Status: Chronic   





(2) History of depression


Status: Chronic   





(3) Nicotine dependence


Status: Chronic   





- Follow-up Referral


Minutes to complete discharge: 20





- AMA


Did Patient Leave Against Medical Advice: No